# Patient Record
Sex: MALE | Race: WHITE | NOT HISPANIC OR LATINO | Employment: FULL TIME | URBAN - METROPOLITAN AREA
[De-identification: names, ages, dates, MRNs, and addresses within clinical notes are randomized per-mention and may not be internally consistent; named-entity substitution may affect disease eponyms.]

---

## 2022-06-16 ENCOUNTER — OFFICE VISIT (OUTPATIENT)
Dept: OTOLARYNGOLOGY | Facility: CLINIC | Age: 32
End: 2022-06-16
Payer: COMMERCIAL

## 2022-06-16 DIAGNOSIS — H60.502 ACUTE OTITIS EXTERNA OF LEFT EAR, UNSPECIFIED TYPE: ICD-10-CM

## 2022-06-16 DIAGNOSIS — Z98.890 HISTORY OF EAR SURGERY: Primary | ICD-10-CM

## 2022-06-16 PROCEDURE — 99999 PR PBB SHADOW E&M-NEW PATIENT-LVL II: ICD-10-PCS | Mod: PBBFAC,,, | Performed by: NURSE PRACTITIONER

## 2022-06-16 PROCEDURE — 69210 REMOVE IMPACTED EAR WAX UNI: CPT | Mod: S$GLB,,, | Performed by: NURSE PRACTITIONER

## 2022-06-16 PROCEDURE — 1159F MED LIST DOCD IN RCRD: CPT | Mod: CPTII,S$GLB,, | Performed by: NURSE PRACTITIONER

## 2022-06-16 PROCEDURE — 99203 OFFICE O/P NEW LOW 30 MIN: CPT | Mod: 25,S$GLB,, | Performed by: NURSE PRACTITIONER

## 2022-06-16 PROCEDURE — 99203 PR OFFICE/OUTPT VISIT, NEW, LEVL III, 30-44 MIN: ICD-10-PCS | Mod: 25,S$GLB,, | Performed by: NURSE PRACTITIONER

## 2022-06-16 PROCEDURE — 1159F PR MEDICATION LIST DOCUMENTED IN MEDICAL RECORD: ICD-10-PCS | Mod: CPTII,S$GLB,, | Performed by: NURSE PRACTITIONER

## 2022-06-16 PROCEDURE — 99999 PR PBB SHADOW E&M-NEW PATIENT-LVL II: CPT | Mod: PBBFAC,,, | Performed by: NURSE PRACTITIONER

## 2022-06-16 PROCEDURE — 69210 EAR CERUMEN REMOVAL: ICD-10-PCS | Mod: S$GLB,,, | Performed by: NURSE PRACTITIONER

## 2022-06-16 RX ORDER — CLOTRIMAZOLE 1 G/ML
SOLUTION TOPICAL 2 TIMES DAILY
Qty: 10 ML | Refills: 1 | Status: SHIPPED | OUTPATIENT
Start: 2022-06-16 | End: 2022-06-30

## 2022-06-16 NOTE — PROGRESS NOTES
Subjective:      Rizwan Rodriguez is a 32 y.o. male who was self-referred for cerumen impaction.    Mr. Rodriguez presents for an ear cleaning.  He reports ear surgery in 2008 for a left ear cholesteatoma with Dr. Freeman in [place].  He believes the surgery was a mastoidectomy.  He was advised cleanings q6 months, his last cleaning was in October.  He reports some recent otorrhea over the past month from the left side, but denies any ear pain, tinnitus, vertigo or worsening hearing loss.  At baseline, his hearing is R>L.    There is not a family history of hearing loss at a young age.  There is a prior history of ear surgery.  There is not a prior history of ear infections.  There is not a history of ear trauma.  He denies a history of significant noise exposure.  He does not wear hearing aids currently.  He has not had a hearing test recently.      Past Medical History  He has no past medical history on file.    Past Surgical History  He has no past surgical history on file.    Family History  His family history is not on file.    Social History  He     Allergies  He has No Known Allergies.    Medications  He currently has no medications in their medication list.    Review of Systems     Constitutional: Negative for chills, fatigue and fever.      HENT: Positive for ear discharge and ear infection.  Negative for ear pain, hearing loss and ringing in the ears.      Respiratory:  Negative for cough, shortness of breath, sleep apnea, snoring and wheezing.      Cardiovascular:  Negative for chest pain, foot swelling, irregular heartbeat and swollen veins.     Gastrointestinal:  Negative for abdominal pain, acid reflux, constipation, diarrhea, heartburn and vomiting.     Genitourinary: Negative for difficulty urinating, sexual problems and frequent urination.     Neurological: Negative for dizziness and light-headedness.          Objective:       Constitutional:   He is oriented to person, place, and time. He  appears well-developed and well-nourished. He appears alert. He is cooperative.  Non-toxic appearance. He does not have a sickly appearance. He does not appear ill. Normal speech.      Head:  Normocephalic and atraumatic. Not macrocephalic and not microcephalic. Head is without raccoon's eyes, without Timmons's sign, without abrasion, without laceration, without right periorbital erythema, without left periorbital erythema and without TMJ tenderness.     Ears:    Right Ear: No lacerations. No drainage, swelling or tenderness. No foreign bodies. No mastoid tenderness. Tympanic membrane is not injected, not scarred, not perforated, not erythematous, not retracted and not bulging. No middle ear effusion. No hemotympanum.   Left Ear: No lacerations. There is drainage. No swelling or tenderness. No foreign bodies. No mastoid tenderness. Tympanic membrane is perforated (possible perforation). Tympanic membrane is not injected, not scarred, not erythematous, not retracted and not bulging.  No middle ear effusion. No hemotympanum.   Ears:      Pulmonary/Chest:   Effort normal.     Psychiatric:   He has a normal mood and affect. His speech is normal and behavior is normal.     Neurological:   He is alert and oriented to person, place, and time. Coordination and gait normal.     Procedure  Cerumen removal performed.  See procedure note.    Data Reviewed  No results found for: WBC  No results found for: EOSINOPHIL  No results found for: EOS  No results found for: PLT  No results found for: GLU  No results found for: IGE    Imaging  No pertinent imaging available.    Audiogram  No audiometric testing available in clinic today.     Assessment:     1. History of ear surgery    2. Acute otitis externa of left ear, unspecified type      Plan:     History of ear surgery        -     D radical mastoidectomy by  2008  -     Ambulatory referral/consult to ENT; Future  -     Needs updated audio     Acute otitis externa of  left ear, unspecified type  -     Significant vertigo with cleaning- Ear Cerumen Removal.  -     clotrimazole (LOTRIMIN) 1 % Soln; Apply topically 2 (two) times daily. Place 4 drops in the left ear using medicine dropper for 14 days

## 2022-06-16 NOTE — PROCEDURES
Ear Cerumen Removal    Date/Time: 6/16/2022 9:30 AM  Performed by: Jojo Ramos NP  Authorized by: Jojo Ramos NP       Local anesthetic:  None  Location details:  Left ear  Procedure type: curette    Cerumen  Removal Results:  Cerumen completely removed  Patient tolerance:  Patient tolerated the procedure well with no immediate complications     Procedure Note:    The patient was brought to the minor procedure room and placed under the operating microscope of the left ear canal which was cleaned of ceruminous and infectious debris. Using a combination of suction, curettes and cup forceps the patient's cerumen impaction was removed. The patient tolerated the procedure well with intermittent vertigo. There were no complications.

## 2022-06-27 ENCOUNTER — OFFICE VISIT (OUTPATIENT)
Dept: OTOLARYNGOLOGY | Facility: CLINIC | Age: 32
End: 2022-06-27
Payer: COMMERCIAL

## 2022-06-27 ENCOUNTER — CLINICAL SUPPORT (OUTPATIENT)
Dept: AUDIOLOGY | Facility: CLINIC | Age: 32
End: 2022-06-27
Payer: COMMERCIAL

## 2022-06-27 VITALS — WEIGHT: 145 LBS

## 2022-06-27 DIAGNOSIS — H90.12 CONDUCTIVE HEARING LOSS OF LEFT EAR WITH UNRESTRICTED HEARING OF RIGHT EAR: Primary | ICD-10-CM

## 2022-06-27 DIAGNOSIS — H90.A12 CONDUCTIVE HEARING LOSS OF LEFT EAR WITH RESTRICTED HEARING OF RIGHT EAR: Primary | ICD-10-CM

## 2022-06-27 DIAGNOSIS — Z98.890 HISTORY OF EAR SURGERY: ICD-10-CM

## 2022-06-27 DIAGNOSIS — H71.92 CHOLESTEATOMA OF LEFT EAR: ICD-10-CM

## 2022-06-27 PROCEDURE — 69220 PR DEBRIDE, MASTOID CAVITY, SIMPLE: ICD-10-PCS | Mod: LT,S$GLB,, | Performed by: OTOLARYNGOLOGY

## 2022-06-27 PROCEDURE — 99999 PR PBB SHADOW E&M-EST. PATIENT-LVL I: ICD-10-PCS | Mod: PBBFAC,,,

## 2022-06-27 PROCEDURE — 99999 PR PBB SHADOW E&M-EST. PATIENT-LVL I: CPT | Mod: PBBFAC,,,

## 2022-06-27 PROCEDURE — 1159F PR MEDICATION LIST DOCUMENTED IN MEDICAL RECORD: ICD-10-PCS | Mod: CPTII,S$GLB,, | Performed by: OTOLARYNGOLOGY

## 2022-06-27 PROCEDURE — 92557 PR COMPREHENSIVE HEARING TEST: ICD-10-PCS | Mod: S$GLB,,,

## 2022-06-27 PROCEDURE — 99999 PR PBB SHADOW E&M-EST. PATIENT-LVL III: ICD-10-PCS | Mod: PBBFAC,,, | Performed by: OTOLARYNGOLOGY

## 2022-06-27 PROCEDURE — 92567 PR TYMPA2METRY: ICD-10-PCS | Mod: S$GLB,,,

## 2022-06-27 PROCEDURE — 92557 COMPREHENSIVE HEARING TEST: CPT | Mod: S$GLB,,,

## 2022-06-27 PROCEDURE — 1159F MED LIST DOCD IN RCRD: CPT | Mod: CPTII,S$GLB,, | Performed by: OTOLARYNGOLOGY

## 2022-06-27 PROCEDURE — 69220 CLEAN OUT MASTOID CAVITY: CPT | Mod: LT,S$GLB,, | Performed by: OTOLARYNGOLOGY

## 2022-06-27 PROCEDURE — 99999 PR PBB SHADOW E&M-EST. PATIENT-LVL III: CPT | Mod: PBBFAC,,, | Performed by: OTOLARYNGOLOGY

## 2022-06-27 PROCEDURE — 92567 TYMPANOMETRY: CPT | Mod: S$GLB,,,

## 2022-06-27 PROCEDURE — 99213 PR OFFICE/OUTPT VISIT, EST, LEVL III, 20-29 MIN: ICD-10-PCS | Mod: 25,S$GLB,, | Performed by: OTOLARYNGOLOGY

## 2022-06-27 PROCEDURE — 99213 OFFICE O/P EST LOW 20 MIN: CPT | Mod: 25,S$GLB,, | Performed by: OTOLARYNGOLOGY

## 2022-06-27 NOTE — PROGRESS NOTES
Rizwan Rodriguez was seen today in the clinic for an audiologic evaluation. Mr. Rodriguez reported a history of left ear surgery in 2007. He reported difficulty hearing with no recent perceived changes in his hearing in the left ear. He endorsed history of noise exposure (i.e. playing music) without the use of hearing protection when in high levels of noise. Mr. Rodriguez denied tinnitus, otalgia, aural fullness and true vertigo.    Tympanometry revealed Type A in the right ear and Type B with a large ear canal volume in the left ear.     Audiogram results revealed essentially normal hearing sensitivity with a mild hearing loss at 8000 Hz only in the right ear and a severe conductive hearing loss (CHL) in the left ear.      Speech reception thresholds were noted at 15 dBHL in the right ear and 75 dBHL in the left ear.    Speech discrimination scores were 96% in the right ear and 80% in the left ear.    Recommendations:  1. Otologic evaluation  2. Annual audiogram or sooner if change perceived  3. Hearing protection in noise  4. Consider hearing aid consultation if communication difficulty perceived    =

## 2022-06-27 NOTE — PROGRESS NOTES
Subjective:       Patient ID: Rizwan Rodriguez is a 32 y.o. male.    Chief Complaint: Ear Drainage    HPI     Rizwan Rodriguez is a 32 y.o. male presents for evaluation of left ear. Has a history of left ear CWD mastoidectomy in [place] by Dr. Johnie Freeman in 2007.  States his ear has been doing fine over the years, requiring cleaning every 3-4 mo.  He reports significant hearing loss in that ear.     Review of Systems   Constitutional: Negative for chills and fever.   HENT: Positive for ear discharge. Negative for sore throat and trouble swallowing.    Respiratory: Negative for apnea and chest tightness.    Cardiovascular: Negative for chest pain.         Objective:      Physical Exam  Vitals and nursing note reviewed.   Constitutional:       Appearance: Normal appearance.   HENT:      Head: Normocephalic and atraumatic.      Right Ear: Tympanic membrane, ear canal and external ear normal. There is no impacted cerumen.      Left Ear: There is no impacted cerumen.   Neurological:      Mental Status: He is alert.         Procedure: mastoid cavity debridement, simple  The patient was brought to the minor procedure room and the left cavity was cleaned of squamous and cerumenous debris with micro dissection techniques using the operating microscope. No evidence of recurrent or residual cholesteatoma. There is no tympanic membrane, malleus and incus appear absent, stapes appears to be present and covered by mucosal inflammation. Cavity seems moderately healthy and accessible Patient tolerated procedure well.    Data Reviewed:        Audiogram tracings independently reviewed and discussed with patient shows maximum CHL on left side with normal hearing on right    Assessment:       Problem List Items Addressed This Visit    None     Visit Diagnoses     Conductive hearing loss of left ear with unrestricted hearing of right ear    -  Primary    History of ear surgery        Cholesteatoma of left ear              Plan:         will have difficulty maintaining dry cavity with radical mastoidectomy ( no TM)   could consider revision tympmastoid for TM grafting, if continues to have problems   discussed option of bone conducting hearing aid for maximum conductive hearing loss, pt uninterested at this time   f/u 3-4 mo for routine cleaning.

## 2023-01-23 ENCOUNTER — TELEPHONE (OUTPATIENT)
Dept: OTOLARYNGOLOGY | Facility: CLINIC | Age: 33
End: 2023-01-23
Payer: COMMERCIAL

## 2023-03-10 ENCOUNTER — OFFICE VISIT (OUTPATIENT)
Dept: OTOLARYNGOLOGY | Facility: CLINIC | Age: 33
End: 2023-03-10
Payer: COMMERCIAL

## 2023-03-10 DIAGNOSIS — H61.20 IMPACTED CERUMEN, UNSPECIFIED LATERALITY: Primary | ICD-10-CM

## 2023-03-10 PROCEDURE — 99213 PR OFFICE/OUTPT VISIT, EST, LEVL III, 20-29 MIN: ICD-10-PCS | Mod: 25,S$GLB,, | Performed by: OTOLARYNGOLOGY

## 2023-03-10 PROCEDURE — 69220 PR DEBRIDE, MASTOID CAVITY, SIMPLE: ICD-10-PCS | Mod: LT,S$GLB,, | Performed by: OTOLARYNGOLOGY

## 2023-03-10 PROCEDURE — 69220 CLEAN OUT MASTOID CAVITY: CPT | Mod: LT,S$GLB,, | Performed by: OTOLARYNGOLOGY

## 2023-03-10 PROCEDURE — 99213 OFFICE O/P EST LOW 20 MIN: CPT | Mod: 25,S$GLB,, | Performed by: OTOLARYNGOLOGY

## 2023-03-10 NOTE — PROGRESS NOTES
Subjective:       Patient ID: Rizwan Rodriguez is a 33 y.o. male.    Chief Complaint: Ear cleaning    HPI      Rizwan Rodriguez is a 32 y.o. male presents for evaluation of left ear. Has a history of left ear CWD mastoidectomy in [place] by Dr. Johnie Freeman in 2007.  States his ear has been doing fine over the years, requiring cleaning every 3-4 mo.  He reports significant hearing loss in that ear.     Review of Systems   Constitutional:  Negative for activity change and appetite change.   HENT:  Negative for dental problem and drooling.    Eyes:  Negative for discharge and itching.   Respiratory:  Negative for apnea and chest tightness.    Cardiovascular:  Negative for chest pain and claudication.   Gastrointestinal:  Negative for abdominal distention and abdominal pain.   Endocrine: Negative for cold intolerance and heat intolerance.   Genitourinary:  Negative for bladder incontinence and difficulty urinating.   Musculoskeletal:  Negative for arthralgias and back pain.   Integumentary:  Negative for breast mass and breast discharge.   Allergic/Immunologic: Negative for environmental allergies and food allergies.   Neurological:  Negative for dizziness, coordination difficulties and coordination difficulties.   Hematological:  Negative for adenopathy. Does not bruise/bleed easily.   Psychiatric/Behavioral:  Negative for agitation and behavioral problems.    Breast: Negative for mass      Objective:    Physical Exam  Vitals and nursing note reviewed.   Constitutional:       Appearance: Normal appearance.   HENT:      Head: Normocephalic and atraumatic.      Right Ear: Tympanic membrane, ear canal and external ear normal. There is no impacted cerumen.      Left Ear: There is no impacted cerumen.   Neurological:      Mental Status: He is alert.        Procedure: mastoid cavity debridement, simple  The patient was brought to the minor procedure room and the left cavity was cleaned of squamous and cerumenous  debris with micro dissection techniques using the operating microscope. No evidence of recurrent or residual cholesteatoma. There is no tympanic membrane, malleus and incus appear absent, stapes appears to be present and covered by mucosal inflammation. Cavity seems moderately healthy and accessible. Patient tolerated procedure well.    Assessment:       Problem List Items Addressed This Visit    None  Visit Diagnoses       Impacted cerumen, unspecified laterality    -  Primary            Plan:       RTC in 6 months

## 2023-05-10 ENCOUNTER — PATIENT MESSAGE (OUTPATIENT)
Dept: OTOLARYNGOLOGY | Facility: CLINIC | Age: 33
End: 2023-05-10
Payer: COMMERCIAL

## 2023-05-10 ENCOUNTER — TELEPHONE (OUTPATIENT)
Dept: OTOLARYNGOLOGY | Facility: CLINIC | Age: 33
End: 2023-05-10
Payer: COMMERCIAL

## 2023-08-25 ENCOUNTER — OFFICE VISIT (OUTPATIENT)
Dept: OTOLARYNGOLOGY | Facility: CLINIC | Age: 33
End: 2023-08-25
Payer: COMMERCIAL

## 2023-08-25 DIAGNOSIS — H95.192 ENCOUNTER FOR MASTOIDECTOMY CAVITY DEBRIDEMENT, LEFT: ICD-10-CM

## 2023-08-25 DIAGNOSIS — H90.12 CONDUCTIVE HEARING LOSS OF LEFT EAR WITH UNRESTRICTED HEARING OF RIGHT EAR: Primary | ICD-10-CM

## 2023-08-25 PROCEDURE — 69220 PR DEBRIDE, MASTOID CAVITY, SIMPLE: ICD-10-PCS | Mod: LT,S$GLB,, | Performed by: OTOLARYNGOLOGY

## 2023-08-25 PROCEDURE — 69220 CLEAN OUT MASTOID CAVITY: CPT | Mod: LT,S$GLB,, | Performed by: OTOLARYNGOLOGY

## 2023-08-25 PROCEDURE — 99212 PR OFFICE/OUTPT VISIT, EST, LEVL II, 10-19 MIN: ICD-10-PCS | Mod: 25,S$GLB,, | Performed by: OTOLARYNGOLOGY

## 2023-08-25 PROCEDURE — 99212 OFFICE O/P EST SF 10 MIN: CPT | Mod: 25,S$GLB,, | Performed by: OTOLARYNGOLOGY

## 2023-08-25 NOTE — PROGRESS NOTES
Subjective:       Patient ID: Rizwan Rodriguez is a 33 y.o. male.    Chief Complaint: Ear cleaning    HPI   Rizwan Rodriguez is a 32 y.o. male presents for evaluation of left ear. Has a history of left ear CWD mastoidectomy in [place] by Dr. Johnie Freeman in 2007.  States his ear has been doing fine over the years, requiring cleaning every 3-4 mo.  He reports significant hearing loss in that ear.     Review of Systems   Constitutional:  Negative for activity change and appetite change.   HENT:  Negative for dental problem and drooling.    Eyes:  Negative for discharge and itching.   Respiratory:  Negative for apnea and chest tightness.    Cardiovascular:  Negative for chest pain and claudication.   Gastrointestinal:  Negative for abdominal distention and abdominal pain.   Endocrine: Negative for cold intolerance and heat intolerance.   Genitourinary:  Negative for bladder incontinence and difficulty urinating.   Musculoskeletal:  Negative for arthralgias and back pain.   Integumentary:  Negative for breast mass and breast discharge.   Allergic/Immunologic: Negative for environmental allergies and food allergies.   Neurological:  Negative for dizziness, coordination difficulties and coordination difficulties.   Hematological:  Negative for adenopathy. Does not bruise/bleed easily.   Psychiatric/Behavioral:  Negative for agitation and behavioral problems.    Breast: Negative for mass        Objective:    Physical Exam  Vitals and nursing note reviewed.   Constitutional:       Appearance: Normal appearance.   HENT:      Head: Normocephalic and atraumatic.      Right Ear: Tympanic membrane, ear canal and external ear normal. There is no impacted cerumen.      Left Ear: There is no impacted cerumen.   Neurological:      Mental Status: He is alert.        Procedure: mastoid cavity debridement, simple  The patient was brought to the minor procedure room and the left cavity was cleaned of squamous and cerumenous  debris with micro dissection techniques using the operating microscope. No evidence of recurrent or residual cholesteatoma. There is no tympanic membrane, malleus and incus appear absent, stapes appears to be present and covered by mucosal inflammation. Cavity seems moderately healthy and accessible. Patient tolerated procedure well.      I personally reviewed the audiogram that was completed on 6/27/22 with the following findings: severe CHL AS      Assessment:       Problem List Items Addressed This Visit    None  Visit Diagnoses       Conductive hearing loss of left ear with unrestricted hearing of right ear    -  Primary            Plan:       Not interested in [facility] at this time  RTC in 6 months for ear cleaning

## 2024-04-09 ENCOUNTER — OFFICE VISIT (OUTPATIENT)
Dept: OTOLARYNGOLOGY | Facility: CLINIC | Age: 34
End: 2024-04-09
Payer: COMMERCIAL

## 2024-04-09 DIAGNOSIS — H71.92 CHOLESTEATOMA OF LEFT EAR: Chronic | ICD-10-CM

## 2024-04-09 DIAGNOSIS — H90.12 CONDUCTIVE HEARING LOSS OF LEFT EAR WITH UNRESTRICTED HEARING OF RIGHT EAR: Chronic | ICD-10-CM

## 2024-04-09 DIAGNOSIS — H95.192 ENCOUNTER FOR MASTOIDECTOMY CAVITY DEBRIDEMENT, LEFT: Primary | ICD-10-CM

## 2024-04-09 PROCEDURE — 69220 CLEAN OUT MASTOID CAVITY: CPT | Mod: LT,S$GLB,, | Performed by: OTOLARYNGOLOGY

## 2024-04-09 PROCEDURE — 99999 PR PBB SHADOW E&M-EST. PATIENT-LVL II: CPT | Mod: PBBFAC,,, | Performed by: OTOLARYNGOLOGY

## 2024-04-09 PROCEDURE — 1159F MED LIST DOCD IN RCRD: CPT | Mod: CPTII,S$GLB,, | Performed by: OTOLARYNGOLOGY

## 2024-04-09 PROCEDURE — 99213 OFFICE O/P EST LOW 20 MIN: CPT | Mod: 25,S$GLB,, | Performed by: OTOLARYNGOLOGY

## 2024-04-09 NOTE — PROGRESS NOTES
Subjective:       Patient ID: Rizwan Rodriguez is a 34 y.o. male.    Chief Complaint: Ear cleaning    HPI      Rizwan Rodriguez is a 32 y.o. male presents for evaluation of left ear. Has a history of left ear CWD mastoidectomy in [place] by Dr. Johnie Freeman in 2007.  States his ear has been doing fine over the years, requiring cleaning every 3-4 mo.  He reports significant hearing loss in that ear.     Review of Systems   Constitutional:  Negative for activity change and appetite change.   HENT:  Negative for dental problem and drooling.    Eyes:  Negative for discharge and itching.   Respiratory:  Negative for apnea and chest tightness.    Cardiovascular:  Negative for chest pain and claudication.   Gastrointestinal:  Negative for abdominal distention and abdominal pain.   Endocrine: Negative for cold intolerance and heat intolerance.   Genitourinary:  Negative for bladder incontinence and difficulty urinating.   Musculoskeletal:  Negative for arthralgias and back pain.   Integumentary:  Negative for breast mass and breast discharge.   Allergic/Immunologic: Negative for environmental allergies and food allergies.   Neurological:  Negative for dizziness and coordination difficulties.   Hematological:  Negative for adenopathy. Does not bruise/bleed easily.   Psychiatric/Behavioral:  Negative for agitation and behavioral problems.    Breast: Negative for mass        Objective:    Physical Exam  Vitals and nursing note reviewed.   Constitutional:       Appearance: Normal appearance.   HENT:      Head: Normocephalic and atraumatic.      Right Ear: Tympanic membrane, ear canal and external ear normal. There is no impacted cerumen.      Left Ear: There is no impacted cerumen.   Neurological:      Mental Status: He is alert.      Data Reviewed:        Audiogram tracings independently reviewed and discussed with patient shows large CHL of left ear      Procedure: mastoid cavity debridement, simple  The patient was  brought to the minor procedure room and the left cavity was cleaned of squamous and cerumenous debris with micro dissection techniques using the operating microscope. No evidence of recurrent or residual cholesteatoma. There is no tympanic membrane, malleus and incus appear absent, stapes appears to be present and covered by mucosal inflammation. Cavity seems moderately healthy and accessible. Patient tolerated procedure well.      Assessment:       Problem List Items Addressed This Visit    None  Visit Diagnoses       Encounter for mastoidectomy cavity debridement, left    -  Primary    Conductive hearing loss of left ear with unrestricted hearing of right ear                  Plan:       Cavity cleared   RTC in 6 months

## 2025-01-31 ENCOUNTER — OFFICE VISIT (OUTPATIENT)
Dept: OTOLARYNGOLOGY | Facility: CLINIC | Age: 35
End: 2025-01-31
Payer: COMMERCIAL

## 2025-01-31 DIAGNOSIS — H71.92 CHOLESTEATOMA OF LEFT EAR: ICD-10-CM

## 2025-01-31 DIAGNOSIS — H90.12 CONDUCTIVE HEARING LOSS OF LEFT EAR WITH UNRESTRICTED HEARING OF RIGHT EAR: ICD-10-CM

## 2025-01-31 DIAGNOSIS — H95.192 ENCOUNTER FOR MASTOIDECTOMY CAVITY DEBRIDEMENT, LEFT: Primary | ICD-10-CM

## 2025-01-31 PROCEDURE — 69220 CLEAN OUT MASTOID CAVITY: CPT | Mod: LT,S$GLB,, | Performed by: OTOLARYNGOLOGY

## 2025-01-31 PROCEDURE — 1160F RVW MEDS BY RX/DR IN RCRD: CPT | Mod: CPTII,S$GLB,, | Performed by: OTOLARYNGOLOGY

## 2025-01-31 PROCEDURE — 99213 OFFICE O/P EST LOW 20 MIN: CPT | Mod: 25,S$GLB,, | Performed by: OTOLARYNGOLOGY

## 2025-01-31 PROCEDURE — 99999 PR PBB SHADOW E&M-EST. PATIENT-LVL II: CPT | Mod: PBBFAC,,, | Performed by: OTOLARYNGOLOGY

## 2025-01-31 PROCEDURE — 1159F MED LIST DOCD IN RCRD: CPT | Mod: CPTII,S$GLB,, | Performed by: OTOLARYNGOLOGY

## 2025-01-31 NOTE — PROGRESS NOTES
Subjective:       Patient ID: Rizwan Rodriguez is a 34 y.o. male.    Chief Complaint: Ear cleaning    HPI      Rizwan Rodriguez is a 32 y.o. male presents for evaluation of left ear. Has a history of left ear CWD mastoidectomy in [place] by Dr. Johnie Freeman in 2007.      Presents today for routine cavity management.  Overall he reports doing well denies any problems with pain discharge or change in his hearing.    Review of Systems   Constitutional: Negative.  Negative for activity change and appetite change.   HENT: Negative.  Negative for dental problem and drooling.    Eyes: Negative.  Negative for discharge and itching.   Respiratory: Negative.  Negative for apnea and chest tightness.    Cardiovascular: Negative.  Negative for chest pain and claudication.   Gastrointestinal: Negative.  Negative for abdominal distention and abdominal pain.   Endocrine: Negative.  Negative for cold intolerance and heat intolerance.   Genitourinary: Negative.  Negative for bladder incontinence and difficulty urinating.   Musculoskeletal: Negative.  Negative for arthralgias and back pain.   Integumentary:  Negative for breast mass and breast discharge. Negative.   Allergic/Immunologic: Negative.  Negative for environmental allergies and food allergies.   Neurological: Negative.  Negative for dizziness and coordination difficulties.   Hematological: Negative.  Negative for adenopathy. Does not bruise/bleed easily.   Psychiatric/Behavioral: Negative.  Negative for agitation and behavioral problems.    Breast: Negative for mass        Objective:    Physical Exam  Vitals and nursing note reviewed.   Constitutional:       Appearance: Normal appearance.   HENT:      Head: Normocephalic and atraumatic.      Right Ear: Tympanic membrane, ear canal and external ear normal. There is no impacted cerumen.      Left Ear: There is no impacted cerumen.   Neurological:      Mental Status: He is alert.      Data Reviewed:        Audiogram  tracings independently reviewed and discussed with patient shows large CHL of left ear      Procedure: mastoid cavity debridement, simple  The patient was brought to the minor procedure room and the left cavity was cleaned of squamous and cerumenous debris with micro dissection techniques using the operating microscope. No evidence of recurrent or residual cholesteatoma. There is no tympanic membrane, malleus and incus or stapes Patient tolerated procedure well.      Assessment:       Problem List Items Addressed This Visit    None  Visit Diagnoses       Encounter for mastoidectomy cavity debridement, left    -  Primary    Conductive hearing loss of left ear with unrestricted hearing of right ear        Cholesteatoma of left ear                    Plan:       Cavity cleared   RTC in 6 months